# Patient Record
Sex: MALE | Race: ASIAN | URBAN - METROPOLITAN AREA
[De-identification: names, ages, dates, MRNs, and addresses within clinical notes are randomized per-mention and may not be internally consistent; named-entity substitution may affect disease eponyms.]

---

## 2018-07-11 ENCOUNTER — HOSPITAL ENCOUNTER (OUTPATIENT)
Dept: LAB | Age: 43
Discharge: HOME | End: 2018-07-11
Payer: COMMERCIAL

## 2018-07-11 ENCOUNTER — OFFICE VISIT (OUTPATIENT)
Dept: FAMILY MEDICINE CLINIC | Age: 43
End: 2018-07-11

## 2018-07-11 VITALS
DIASTOLIC BLOOD PRESSURE: 80 MMHG | WEIGHT: 182 LBS | HEIGHT: 66 IN | TEMPERATURE: 97.7 F | OXYGEN SATURATION: 98 % | RESPIRATION RATE: 17 BRPM | SYSTOLIC BLOOD PRESSURE: 117 MMHG | BODY MASS INDEX: 29.25 KG/M2 | HEART RATE: 73 BPM

## 2018-07-11 DIAGNOSIS — R19.5 DARK STOOLS: ICD-10-CM

## 2018-07-11 DIAGNOSIS — B35.0 TINEA CAPITIS: ICD-10-CM

## 2018-07-11 DIAGNOSIS — R53.82 CHRONIC FATIGUE: ICD-10-CM

## 2018-07-11 DIAGNOSIS — R79.89 LOW VITAMIN D LEVEL: ICD-10-CM

## 2018-07-11 DIAGNOSIS — R53.82 CHRONIC FATIGUE: Primary | ICD-10-CM

## 2018-07-11 DIAGNOSIS — G43.009 MIGRAINE WITHOUT AURA AND WITHOUT STATUS MIGRAINOSUS, NOT INTRACTABLE: ICD-10-CM

## 2018-07-11 LAB
ALBUMIN SERPL-MCNC: 3.9 G/DL (ref 3.4–5)
ALBUMIN/GLOB SERPL: 1.4 {RATIO} (ref 0.8–1.7)
ALP SERPL-CCNC: 62 U/L (ref 45–117)
ALT SERPL-CCNC: 32 U/L (ref 16–61)
ANION GAP SERPL CALC-SCNC: 7 MMOL/L (ref 3–18)
AST SERPL-CCNC: 24 U/L (ref 15–37)
BASOPHILS # BLD: 0 K/UL (ref 0–0.1)
BASOPHILS NFR BLD: 0 % (ref 0–2)
BILIRUB SERPL-MCNC: 0.4 MG/DL (ref 0.2–1)
BUN SERPL-MCNC: 17 MG/DL (ref 7–18)
BUN/CREAT SERPL: 20 (ref 12–20)
CALCIUM SERPL-MCNC: 8.6 MG/DL (ref 8.5–10.1)
CHLORIDE SERPL-SCNC: 108 MMOL/L (ref 100–108)
CO2 SERPL-SCNC: 25 MMOL/L (ref 21–32)
CREAT SERPL-MCNC: 0.87 MG/DL (ref 0.6–1.3)
DIFFERENTIAL METHOD BLD: ABNORMAL
EOSINOPHIL # BLD: 0.1 K/UL (ref 0–0.4)
EOSINOPHIL NFR BLD: 1 % (ref 0–5)
ERYTHROCYTE [DISTWIDTH] IN BLOOD BY AUTOMATED COUNT: 13.6 % (ref 11.6–14.5)
EST. AVERAGE GLUCOSE BLD GHB EST-MCNC: 105 MG/DL
GLOBULIN SER CALC-MCNC: 2.8 G/DL (ref 2–4)
GLUCOSE SERPL-MCNC: 115 MG/DL (ref 74–99)
HBA1C MFR BLD: 5.3 % (ref 4.2–5.6)
HCT VFR BLD AUTO: 25.2 % (ref 36–48)
HGB BLD-MCNC: 8.4 G/DL (ref 13–16)
LYMPHOCYTES # BLD: 2.8 K/UL (ref 0.9–3.6)
LYMPHOCYTES NFR BLD: 24 % (ref 21–52)
MCH RBC QN AUTO: 33.3 PG (ref 24–34)
MCHC RBC AUTO-ENTMCNC: 33.3 G/DL (ref 31–37)
MCV RBC AUTO: 100 FL (ref 74–97)
MONOCYTES # BLD: 0.7 K/UL (ref 0.05–1.2)
MONOCYTES NFR BLD: 6 % (ref 3–10)
NEUTS SEG # BLD: 8 K/UL (ref 1.8–8)
NEUTS SEG NFR BLD: 69 % (ref 40–73)
PLATELET # BLD AUTO: 211 K/UL (ref 135–420)
PLATELET COMMENTS,PCOM: ABNORMAL
PMV BLD AUTO: 11.4 FL (ref 9.2–11.8)
POTASSIUM SERPL-SCNC: 4.2 MMOL/L (ref 3.5–5.5)
PROT SERPL-MCNC: 6.7 G/DL (ref 6.4–8.2)
RBC # BLD AUTO: 2.52 M/UL (ref 4.7–5.5)
RBC MORPH BLD: ABNORMAL
SODIUM SERPL-SCNC: 140 MMOL/L (ref 136–145)
TSH SERPL DL<=0.05 MIU/L-ACNC: 1.2 UIU/ML (ref 0.36–3.74)
WBC # BLD AUTO: 11.6 K/UL (ref 4.6–13.2)

## 2018-07-11 PROCEDURE — 84443 ASSAY THYROID STIM HORMONE: CPT | Performed by: FAMILY MEDICINE

## 2018-07-11 PROCEDURE — 80053 COMPREHEN METABOLIC PANEL: CPT | Performed by: FAMILY MEDICINE

## 2018-07-11 PROCEDURE — 82652 VIT D 1 25-DIHYDROXY: CPT | Performed by: FAMILY MEDICINE

## 2018-07-11 PROCEDURE — 36415 COLL VENOUS BLD VENIPUNCTURE: CPT | Performed by: FAMILY MEDICINE

## 2018-07-11 PROCEDURE — 85025 COMPLETE CBC W/AUTO DIFF WBC: CPT | Performed by: FAMILY MEDICINE

## 2018-07-11 PROCEDURE — 83036 HEMOGLOBIN GLYCOSYLATED A1C: CPT | Performed by: FAMILY MEDICINE

## 2018-07-11 RX ORDER — BUTALBITAL, ACETAMINOPHEN AND CAFFEINE 50; 325; 40 MG/1; MG/1; MG/1
1 TABLET ORAL
Qty: 45 TAB | Refills: 2 | Status: SHIPPED | OUTPATIENT
Start: 2018-07-11

## 2018-07-11 RX ORDER — LANOLIN ALCOHOL/MO/W.PET/CERES
CREAM (GRAM) TOPICAL
COMMUNITY
End: 2018-08-18 | Stop reason: ALTCHOICE

## 2018-07-11 RX ORDER — BISMUTH SUBSALICYLATE 262 MG
1 TABLET,CHEWABLE ORAL DAILY
COMMUNITY

## 2018-07-11 RX ORDER — KETOCONAZOLE 20 MG/G
CREAM TOPICAL 2 TIMES DAILY
Qty: 15 G | Refills: 0 | Status: SHIPPED | OUTPATIENT
Start: 2018-07-11 | End: 2018-08-18 | Stop reason: ALTCHOICE

## 2018-07-11 NOTE — PROGRESS NOTES
Shaheen Parmar is a 38 y.o.  male and presents with worsening fatigue, dark stool, scalp skin lesion, hx of vit D, nausea  myalgias and migraines. He has not been seen in at least 3 years. No significant meds on a regular basis. Chief Complaint   Patient presents with    Nausea    Generalized Body Aches     Subjective: Additional Concerns: none    Patient Active Problem List    Diagnosis Date Noted    History of migraine 01/09/2015    History of vitamin D deficiency 01/09/2015     Current Outpatient Prescriptions   Medication Sig Dispense Refill    multivitamin (ONE A DAY) tablet Take 1 Tab by mouth daily.  melatonin 3 mg tablet Take  by mouth.  butalbital-acetaminophen-caffeine (FIORICET, ESGIC) -40 mg per tablet Take 1 Tab by mouth every six (6) hours as needed for Pain. Indications: Migraine 45 Tab 2    ketoconazole (NIZORAL) 2 % topical cream Apply  to affected area two (2) times a day. For 2-4 weeks. 15 g 0     No Known Allergies  Past Medical History:   Diagnosis Date    Migraine      History reviewed. No pertinent surgical history.   Family History   Problem Relation Age of Onset    Hypertension Mother     Elevated Lipids Mother     Diabetes Father     Hypertension Father    Manoj.Moras Elevated Lipids Father     Heart Disease Father      Social History   Substance Use Topics    Smoking status: Current Every Day Smoker     Packs/day: 0.50    Smokeless tobacco: Never Used    Alcohol use No     ROS     General: negative for - chills, fatigue, fever, weight change  Endo: negative for - hot flashes, polydipsia/polyuria or temperature intolerance  Resp: negative for - cough, shortness of breath or wheezing  CV: negative for - chest pain, edema or palpitations  GI: negative for - abdominal pain, change in bowel habits, constipation, diarrhea or positive nausea, novomiting  : negative for - dysuria, hematuria, incontinence  MSK: negative for - joint pain, joint swelling or muscle pain  Neuro: negative for - confusion, positive headaches, no seizures or weakness  Derm: negative for - dry skin, hair changes, positive scalp concentric rash or skin lesion changes    Objective:  Vitals:    07/11/18 0945   BP: 117/80   Pulse: 73   Resp: 17   Temp: 97.7 °F (36.5 °C)   TempSrc: Oral   SpO2: 98%   Weight: 182 lb (82.6 kg)   Height: 5' 6\" (1.676 m)   PainSc:   1     PE    Alert, well appearing, and in no distress, oriented to person, place, and time and overweight  General appearance - alert, well appearing, and in no distress, oriented to person, place, and time and normal appearing weight  Mental status - alert, oriented to person, place, and time, normal mood, behavior, speech, dress, motor activity, and thought processes  Chest - clear to auscultation, no wheezes, rales or rhonchi, symmetric air entry  Heart - normal rate, regular rhythm, normal S1, S2, no murmurs, rubs, clicks or gallops  Musculoskeletal - no joint tenderness, deformity or swelling  Extremities - peripheral pulses normal, no pedal edema, no clubbing or cyanosis  Skin - concentric raised, scaly rash on right sided scalp that is slightly pruritic. No open lesion or wound. LABS   No visits with results within 6 Month(s) from this visit. Latest known visit with results is:    Office Visit on 01/09/2015   Component Date Value Ref Range Status    VALID INTERNAL CONTROL POC 01/09/2015 Yes   Final    QuickVue Influenza test 01/09/2015 Negative  Negative Final     TESTS  Results for orders placed or performed in visit on 01/09/15   AMB POC RAPID INFLUENZA TEST   Result Value Ref Range    VALID INTERNAL CONTROL POC Yes     QuickVue Influenza test Negative Negative     Assessment/Plan:      1. Chronic fatigue  - CBC WITH AUTOMATED DIFF; Future  - METABOLIC PANEL, COMPREHENSIVE; Future  - TSH 3RD GENERATION; Future  - HEMOGLOBIN A1C WITH EAG; Future    2. Low vitamin D level  - VITAMIN D, 1, 25 DIHYDROXY; Future    3.  Dark stools  - REFERRAL TO GASTROENTEROLOGY work up and possble colonoscopy    4. Migraine without aura and without status migrainosus, not intractable  - butalbital-acetaminophen-caffeine (FIORICET, ESGIC) -40 mg per tablet; Take 1 Tab by mouth every six (6) hours as needed for Pain. Indications: Migraine  Dispense: 45 Tab; Refill: 2    5. Tinea capitis right sided scalp   - ketoconazole (NIZORAL) 2 % topical cream; Apply  to affected area two (2) times a day. For 2-4 weeks. Dispense: 15 g; Refill: 0    Lab review: orders written for new lab studies as appropriate; see orders. I have discussed the diagnosis with the patient and the intended plan as seen in the above orders. The patient has received an after-visit summary and questions were answered concerning future plans. I have discussed medication side effects and warnings with the patient as well. I have reviewed the plan of care with the patient, accepted their input and they are in agreement with the treatment goals. Follow-up Disposition:  Return in about 4 weeks (around 8/8/2018), or if not better from scalp lesion.     Jose Armando Lozano MD

## 2018-07-11 NOTE — LETTER
NOTIFICATION RETURN TO WORK  
 
7/11/2018 10:02 AM 
 
Mr. Collette Fillers [address] 
[address] [place] [address] To Whom It May Concern: 
 
Trevon Bassett is currently under the care of [address] [place] [address]. He will return to work on: 07/16/2018 Please excuse from 07/10/2018 - 07/15/2018 due to health concerns. If there are questions or concerns please have the patient contact our office. Sincerely, Rosi Johnson MD

## 2018-07-11 NOTE — PROGRESS NOTES
Severo Angeles is a 38 y.o. male presents to office for nausea, body aches, dizziness    Medication list has been reviewed with Severo Angeles and updated as of today's date     Health Maintenance items with a due date reviewed with patient:  Health Maintenance Due   Topic Date Due    Pneumococcal 19-64 Medium Risk (1 of 1 - PPSV23) 09/13/1992    DTaP/Tdap/Td series (1 - Tdap) 09/13/1994

## 2018-07-11 NOTE — PATIENT INSTRUCTIONS
Fatigue: Care Instructions  Your Care Instructions    Fatigue is a feeling of tiredness, exhaustion, or lack of energy. You may feel fatigue because of too much or not enough activity. It can also come from stress, lack of sleep, boredom, and poor diet. Many medical problems, such as viral infections, can cause fatigue. Emotional problems, especially depression, are often the cause of fatigue. Fatigue is most often a symptom of another problem. Treatment for fatigue depends on the cause. For example, if you have fatigue because you have a certain health problem, treating this problem also treats your fatigue. If depression or anxiety is the cause, treatment may help. Follow-up care is a key part of your treatment and safety. Be sure to make and go to all appointments, and call your doctor if you are having problems. It's also a good idea to know your test results and keep a list of the medicines you take. How can you care for yourself at home? · Get regular exercise. But don't overdo it. Go back and forth between rest and exercise. · Get plenty of rest.  · Eat a healthy diet. Do not skip meals, especially breakfast.  · Reduce your use of caffeine, tobacco, and alcohol. Caffeine is most often found in coffee, tea, cola drinks, and chocolate. · Limit medicines that can cause fatigue. This includes tranquilizers and cold and allergy medicines. When should you call for help? Watch closely for changes in your health, and be sure to contact your doctor if:    · You have new symptoms such as fever or a rash.     · Your fatigue gets worse.     · You have been feeling down, depressed, or hopeless. Or you may have lost interest in things that you usually enjoy.     · You are not getting better as expected. Where can you learn more? Go to http://crista-guido.info/. Enter G032 in the search box to learn more about \"Fatigue: Care Instructions. \"  Current as of: November 20, 2017  Content Version: 11.7  © 0760-3536 [employer]. Care instructions adapted under license by [employer] (which disclaims liability or warranty for this information). If you have questions about a medical condition or this instruction, always ask your healthcare professional. [address] any warranty or liability for your use of this information. Migraine Headache: Care Instructions  Your Care Instructions  Migraines are painful, throbbing headaches that often start on one side of the head. They may cause nausea and vomiting and make you sensitive to light, sound, or smell. Without treatment, migraines can last from 4 hours to a few days. Medicines can help prevent migraines or stop them after they have started. Your doctor can help you find which ones work best for you. Follow-up care is a key part of your treatment and safety. Be sure to make and go to all appointments, and call your doctor if you are having problems. It's also a good idea to know your test results and keep a list of the medicines you take. How can you care for yourself at home? · Do not drive if you have taken a prescription pain medicine. · Rest in a quiet, dark room until your headache is gone. Close your eyes, and try to relax or go to sleep. Don't watch TV or read. · Put a cold, moist cloth or cold pack on the painful area for 10 to 20 minutes at a time. Put a thin cloth between the cold pack and your skin. · Use a warm, moist towel or a heating pad set on low to relax tight shoulder and neck muscles. · Have someone gently massage your neck and shoulders. · Take your medicines exactly as prescribed. Call your doctor if you think you are having a problem with your medicine. You will get more details on the specific medicines your doctor prescribes. · Be careful not to take pain medicine more often than the instructions allow.  You could get worse or more frequent headaches when the medicine wears off. To prevent migraines  · Keep a headache diary so you can figure out what triggers your headaches. Avoiding triggers may help you prevent headaches. Record when each headache began, how long it lasted, and what the pain was like. (Was it throbbing, aching, stabbing, or dull?) Write down any other symptoms you had with the headache, such as nausea, flashing lights or dark spots, or sensitivity to bright light or loud noise. Note if the headache occurred near your period. List anything that might have triggered the headache. Triggers may include certain foods (chocolate, cheese, wine) or odors, smoke, bright light, stress, or lack of sleep. · If your doctor has prescribed medicine for your migraines, take it as directed. You may have medicine that you take only when you get a migraine and medicine that you take all the time to help prevent migraines. ¨ If your doctor has prescribed medicine for when you get a headache, take it at the first sign of a migraine, unless your doctor has given you other instructions. ¨ If your doctor has prescribed medicine to prevent migraines, take it exactly as prescribed. Call your doctor if you think you are having a problem with your medicine. · Find healthy ways to deal with stress. Migraines are most common during or right after stressful times. Take time to relax before and after you do something that has caused a migraine in the past.  · Try to keep your muscles relaxed by keeping good posture. Check your jaw, face, neck, and shoulder muscles for tension. Try to relax them. When you sit at a desk, change positions often. And make sure to stretch for 30 seconds each hour. · Get plenty of sleep and exercise. · Eat meals on a regular schedule. Avoid foods and drinks that often trigger migraines.  These include chocolate, alcohol (especially red wine and port), aspartame, monosodium glutamate (MSG), and some additives found in foods (such as hot dogs, kam, cold cuts, aged cheeses, and pickled foods). · Limit caffeine. Don't drink too much coffee, tea, or soda. But don't quit caffeine suddenly. That can also give you migraines. · Do not smoke or allow others to smoke around you. If you need help quitting, talk to your doctor about stop-smoking programs and medicines. These can increase your chances of quitting for good. · If you are taking birth control pills or hormone therapy, talk to your doctor about whether they are triggering your migraines. When should you call for help? Call 911 anytime you think you may need emergency care. For example, call if:    · You have signs of a stroke. These may include:  ¨ Sudden numbness, paralysis, or weakness in your face, arm, or leg, especially on only one side of your body. ¨ Sudden vision changes. ¨ Sudden trouble speaking. ¨ Sudden confusion or trouble understanding simple statements. ¨ Sudden problems with walking or balance. ¨ A sudden, severe headache that is different from past headaches.    Call your doctor now or seek immediate medical care if:    · You have new or worse nausea and vomiting.     · You have a new or higher fever.     · Your headache gets much worse.    Watch closely for changes in your health, and be sure to contact your doctor if:    · You are not getting better after 2 days (48 hours). Where can you learn more? Go to http://crista-guido.info/. Enter P022 in the search box to learn more about \"Migraine Headache: Care Instructions. \"  Current as of: October 9, 2017  Content Version: 11.7  © 8395-6929 [employer]. Care instructions adapted under license by [employer] (which disclaims liability or warranty for this information). If you have questions about a medical condition or this instruction, always ask your healthcare professional. [place] [address] any warranty or liability for your use of this information.        Lower Gastrointestinal Bleeding: Care Instructions  Your Care Instructions    The digestive or gastrointestinal tract goes from the mouth to the anus. It is often called the GI tract. Bleeding in the lower GI tract can happen anywhere in your small or large intestine. It can also happen in your rectum or anus. In some cases, it is caused by an infection, cancer, or inflammatory bowel disease. Or it may be caused by hemorrhoids, diverticulitis, or clotting problems. Light bleeding may not cause any symptoms at first. But if you continue to bleed for a while, you may feel very weak or tired. Sudden, heavy bleeding means you need to see a doctor right away. This kind of bleeding can be very dangerous. But it can usually be cured or controlled. The doctor may do some tests to find the cause of your bleeding. Follow-up care is a key part of your treatment and safety. Be sure to make and go to all appointments, and call your doctor if you are having problems. It's also a good idea to know your test results and keep a list of the medicines you take. How can you care for yourself at home? · Be safe with medicines. Take your medicines exactly as prescribed. Call your doctor if you think you are having a problem with your medicine. You will get more details on the specific medicines your doctor prescribes. · Do not take aspirin or other anti-inflammatory medicines, such as naproxen (Aleve) or ibuprofen (Advil, Motrin), without talking to your doctor first. Ask your doctor if it is okay to use acetaminophen (Tylenol). · Do not drink alcohol. · The bleeding may make you lose iron. So it's important to eat foods that have a lot of iron. These include red meat, shellfish, poultry, and eggs. They also include beans, raisins, whole-grain breads, and leafy green vegetables. If you want help planning meals, you can meet with a dietitian. When should you call for help? Call 911 anytime you think you may need emergency care.  For example, call if:    · You have sudden, severe belly pain.     · You vomit blood or what looks like coffee grounds.     · You passed out (lost consciousness).     · Your stools are maroon or very bloody.    Call your doctor now or seek immediate medical care if:    · You are dizzy or lightheaded, or you feel like you may faint.     · Your stools are black and look like tar, or they have streaks of blood.     · You have belly pain.     · You vomit or have nausea.    Watch closely for changes in your health, and be sure to contact your doctor if you do not get better as expected. Where can you learn more? Go to http://crista-guido.info/. Enter E211 in the search box to learn more about \"Lower Gastrointestinal Bleeding: Care Instructions. \"  Current as of: November 20, 2017  Content Version: 11.7  © 4046-3382 [employer]. Care instructions adapted under license by [employer] (which disclaims liability or warranty for this information). If you have questions about a medical condition or this instruction, always ask your healthcare professional. [place] [address] any warranty or liability for your use of this information. Ringworm of the Scalp: Care Instructions  Your Care Instructions  Ringworm is a fungus infection of the skin. It is not caused by a worm or bug. Ringworm causes round patches of baldness or scaly skin on the scalp. Ringworm of the scalp is most common in children 1to 5years old. Sometimes a blister-like rash appears on the face with ringworm of the scalp. This is an allergic reaction that usually clears when the ringworm is treated. The fungus that causes ringworm of the scalp spreads from person to person. You can catch ringworm by sharing hats, lucas, brushes, towels, telephones, or sports equipment. You can also get it by touching a person with ringworm. Once in a while, it can also spread from a dog or cat to a person.   Ringworm of the scalp is treated with pills. Ringworm may come back after treatment. Treating ringworm of the scalp can prevent scarring and permanent hair loss. Follow-up care is a key part of your treatment and safety. Be sure to make and go to all appointments, and call your doctor if you are having problems. It's also a good idea to know your test results and keep a list of the medicines you take. How can you care for yourself at home? · Take your medicines exactly as prescribed. Call your doctor if you have any problems with your medicine. · Ask your doctor if a shampoo might help. Special shampoos for ringworm contain selenium sulfide or ketoconazole. Your doctor can let you know if and how often you can use one. · To prevent spreading ringworm:  ¨ As soon as you start treatment, throw away your lucas and brushes, and buy new ones. Do not share hats, sport equipment, or other objects. Ringworm-causing fungus can live on objects, people, or animals for several months. ¨ Wash your hands well after caring for someone with ringworm. Adults who have contact with a child with ringworm of the scalp can become a carrier. A carrier does not have a ringworm infection but can pass ringworm to others. ¨ Wash your clothes, towels, and bed sheets in hot, soapy water. When should you call for help? Call your doctor now or seek immediate medical care if:    · You have signs of infection such as:  ¨ Increased pain, swelling, redness, tenderness, or heat. ¨ Red streaks extending from the area. ¨ Pus coming from the rash on your skin. ¨ A fever.    Watch closely for changes in your health, and be sure to contact your doctor if:    · Your ringworm does not improve after 2 weeks of treatment.     · You do not get better as expected. Where can you learn more? Go to http://crista-guido.info/. Enter 6210 2799 in the search box to learn more about \"Ringworm of the Scalp: Care Instructions. \"  Current as of: October 5, 2017  Content Version: 11.7  © 6470-2454 [employer]. Care instructions adapted under license by [employer] (which disclaims liability or warranty for this information). If you have questions about a medical condition or this instruction, always ask your healthcare professional. [address] any warranty or liability for your use of this information.

## 2018-07-12 ENCOUNTER — TELEPHONE (OUTPATIENT)
Dept: FAMILY MEDICINE CLINIC | Age: 43
End: 2018-07-12

## 2018-07-12 LAB — 1,25(OH)2D3 SERPL-MCNC: 79 PG/ML (ref 19.9–79.3)

## 2018-07-12 NOTE — PROGRESS NOTES
Pls inform patient that he has significant anemia on CBC, pending Vit D. Pls ask for a visit to discuss further work up and treatment.

## 2018-07-12 NOTE — TELEPHONE ENCOUNTER
----- Message from Rupert Kim MD sent at 7/12/2018  9:10 AM EDT -----  Pls inform patient that he has significant anemia on CBC, pending Vit D. Pls ask for a visit to discuss further work up and treatment.

## 2018-07-12 NOTE — TELEPHONE ENCOUNTER
Discussed abnormal lab results with patient's wife Jarred Zee. Scheduled patient for an appt to f/u with Dr. Kennedi Pritchett 07/17/2018 @215pm. Closing encounter.

## 2018-07-14 NOTE — PROGRESS NOTES
Pls report rest of labs essentially normal. Pls expedite referral to GI for possible GI bleed with anemia.

## 2018-07-17 ENCOUNTER — TELEPHONE (OUTPATIENT)
Dept: FAMILY MEDICINE CLINIC | Age: 43
End: 2018-07-17

## 2018-07-17 NOTE — TELEPHONE ENCOUNTER
----- Message from Xiang Donald MD sent at 7/14/2018  9:37 AM EDT -----  Pls report rest of labs essentially normal. Pls expedite referral to GI for possible GI bleed with anemia.

## 2018-07-18 ENCOUNTER — HOSPITAL ENCOUNTER (OUTPATIENT)
Dept: LAB | Age: 43
Discharge: HOME | End: 2018-07-18
Payer: COMMERCIAL

## 2018-07-18 ENCOUNTER — OFFICE VISIT (OUTPATIENT)
Dept: FAMILY MEDICINE CLINIC | Age: 43
End: 2018-07-18

## 2018-07-18 VITALS
RESPIRATION RATE: 17 BRPM | DIASTOLIC BLOOD PRESSURE: 88 MMHG | BODY MASS INDEX: 29.25 KG/M2 | TEMPERATURE: 98 F | WEIGHT: 182 LBS | OXYGEN SATURATION: 98 % | SYSTOLIC BLOOD PRESSURE: 123 MMHG | HEIGHT: 66 IN | HEART RATE: 70 BPM

## 2018-07-18 DIAGNOSIS — Z13.220 SCREENING CHOLESTEROL LEVEL: ICD-10-CM

## 2018-07-18 DIAGNOSIS — K92.2 GASTROINTESTINAL HEMORRHAGE, UNSPECIFIED GASTROINTESTINAL HEMORRHAGE TYPE: ICD-10-CM

## 2018-07-18 DIAGNOSIS — K92.2 GASTROINTESTINAL HEMORRHAGE, UNSPECIFIED GASTROINTESTINAL HEMORRHAGE TYPE: Primary | ICD-10-CM

## 2018-07-18 LAB
BASOPHILS # BLD: 0 K/UL (ref 0–0.1)
BASOPHILS NFR BLD: 0 % (ref 0–2)
DIFFERENTIAL METHOD BLD: ABNORMAL
EOSINOPHIL # BLD: 0.1 K/UL (ref 0–0.4)
EOSINOPHIL NFR BLD: 1 % (ref 0–5)
ERYTHROCYTE [DISTWIDTH] IN BLOOD BY AUTOMATED COUNT: 15.4 % (ref 11.6–14.5)
HCT VFR BLD AUTO: 26.3 % (ref 36–48)
HGB BLD-MCNC: 8.2 G/DL (ref 13–16)
LYMPHOCYTES # BLD: 2.1 K/UL (ref 0.9–3.6)
LYMPHOCYTES NFR BLD: 25 % (ref 21–52)
MCH RBC QN AUTO: 33.2 PG (ref 24–34)
MCHC RBC AUTO-ENTMCNC: 31.2 G/DL (ref 31–37)
MCV RBC AUTO: 106.5 FL (ref 74–97)
MONOCYTES # BLD: 0.5 K/UL (ref 0.05–1.2)
MONOCYTES NFR BLD: 6 % (ref 3–10)
NEUTS SEG # BLD: 5.8 K/UL (ref 1.8–8)
NEUTS SEG NFR BLD: 68 % (ref 40–73)
PLATELET # BLD AUTO: 379 K/UL (ref 135–420)
PMV BLD AUTO: 9.8 FL (ref 9.2–11.8)
RBC # BLD AUTO: 2.47 M/UL (ref 4.7–5.5)
WBC # BLD AUTO: 8.6 K/UL (ref 4.6–13.2)

## 2018-07-18 PROCEDURE — 36415 COLL VENOUS BLD VENIPUNCTURE: CPT | Performed by: FAMILY MEDICINE

## 2018-07-18 PROCEDURE — 80061 LIPID PANEL: CPT | Performed by: FAMILY MEDICINE

## 2018-07-18 PROCEDURE — 85025 COMPLETE CBC W/AUTO DIFF WBC: CPT | Performed by: FAMILY MEDICINE

## 2018-07-18 RX ORDER — DEXLANSOPRAZOLE 60 MG/1
CAPSULE, DELAYED RELEASE ORAL
COMMUNITY
End: 2018-08-18 | Stop reason: ALTCHOICE

## 2018-07-18 NOTE — PROGRESS NOTES
Wayne Gutierrez is a 38 y.o.  male and presents with F/U for possible upper GI bleed anemia and fatigue. Subjective: Additional Concerns: none    Patient Active Problem List    Diagnosis Date Noted    History of migraine 01/09/2015    History of vitamin D deficiency 01/09/2015     Current Outpatient Prescriptions   Medication Sig Dispense Refill    multivitamin (ONE A DAY) tablet Take 1 Tab by mouth daily.  melatonin 3 mg tablet Take  by mouth.  butalbital-acetaminophen-caffeine (FIORICET, ESGIC) -40 mg per tablet Take 1 Tab by mouth every six (6) hours as needed for Pain. Indications: Migraine 45 Tab 2    ketoconazole (NIZORAL) 2 % topical cream Apply  to affected area two (2) times a day. For 2-4 weeks. 15 g 0     No Known Allergies  Past Medical History:   Diagnosis Date    Migraine      No past surgical history on file.   Family History   Problem Relation Age of Onset    Hypertension Mother     Elevated Lipids Mother     Diabetes Father     Hypertension Father    [facility] Elevated Lipids Father     Heart Disease Father      Social History   Substance Use Topics    Smoking status: Current Every Day Smoker     Packs/day: 0.50    Smokeless tobacco: Never Used    Alcohol use No     ROS     General: negative for - chills, fatigue, fever, weight change  Resp: negative for - cough, shortness of breath or wheezing  CV: negative for - chest pain, edema or palpitations  GI: negative for - abdominal pain, change in bowel habits, constipation, diarrhea or nausea/vomiting  : negative for - dysuria, hematuria, incontinence, pelvic pain or vulvar/vaginal symptoms    Objective:    alert, well appearing, and in no distress, oriented to person, place, and time and overweight  General appearance - alert, well appearing, and in no distress, oriented to person, place, and time and overweight  Mental status - alert, oriented to person, place, and time, normal mood, behavior, speech, dress, motor activity, and thought processes  Chest - clear to auscultation, no wheezes, rales or rhonchi, symmetric air entry  Heart - normal rate, regular rhythm, normal S1, S2, no murmurs, rubs, clicks or gallops  Extremities - peripheral pulses normal, no pedal edema, no clubbing or cyanosis    [facility] Outpatient Visit on 07/11/2018   Component Date Value Ref Range Status    WBC 07/11/2018 11.6  4.6 - 13.2 K/uL Final    RBC 07/11/2018 2.52* 4.70 - 5.50 M/uL Final    HGB 07/11/2018 8.4* 13.0 - 16.0 g/dL Final    HCT 07/11/2018 25.2* 36.0 - 48.0 % Final    MCV 07/11/2018 100.0* 74.0 - 97.0 FL Final    MCH 07/11/2018 33.3  24.0 - 34.0 PG Final    MCHC 07/11/2018 33.3  31.0 - 37.0 g/dL Final    RDW 07/11/2018 13.6  11.6 - 14.5 % Final    PLATELET 79/05/7768 861  135 - 420 K/uL Final    MPV 07/11/2018 11.4  9.2 - 11.8 FL Final    NEUTROPHILS 07/11/2018 69  40 - 73 % Final    LYMPHOCYTES 07/11/2018 24  21 - 52 % Final    MONOCYTES 07/11/2018 6  3 - 10 % Final    EOSINOPHILS 07/11/2018 1  0 - 5 % Final    BASOPHILS 07/11/2018 0  0 - 2 % Final    ABS. NEUTROPHILS 07/11/2018 8.0  1.8 - 8.0 K/UL Final    ABS. LYMPHOCYTES 07/11/2018 2.8  0.9 - 3.6 K/UL Final    ABS. MONOCYTES 07/11/2018 0.7  0.05 - 1.2 K/UL Final    ABS. EOSINOPHILS 07/11/2018 0.1  0.0 - 0.4 K/UL Final    ABS.  BASOPHILS 07/11/2018 0.0  0.0 - 0.1 K/UL Final    PLATELET COMMENTS 38/56/9183 ADEQUATE PLATELETS    Final    RBC COMMENTS 07/11/2018     Final                    Value:MACROCYTOSIS  1+      DF 07/11/2018 MANUAL    Final    Sodium 07/11/2018 140  136 - 145 mmol/L Final    Potassium 07/11/2018 4.2  3.5 - 5.5 mmol/L Final    Chloride 07/11/2018 108  100 - 108 mmol/L Final    CO2 07/11/2018 25  21 - 32 mmol/L Final    Anion gap 07/11/2018 7  3.0 - 18 mmol/L Final    Glucose 07/11/2018 115* 74 - 99 mg/dL Final    BUN 07/11/2018 17  7.0 - 18 MG/DL Final    Creatinine 07/11/2018 0.87  0.6 - 1.3 MG/DL Final    BUN/Creatinine ratio 07/11/2018 20  12 - 20   Final    GFR est AA 07/11/2018 >60  >60 ml/min/1.73m2 Final    GFR est non-AA 07/11/2018 >60  >60 ml/min/1.73m2 Final    Comment: (NOTE)  Estimated GFR is calculated using the Modification of Diet in Renal   Disease (MDRD) Study equation, reported for both  Americans   (GFRAA) and non- Americans (GFRNA), and normalized to 1.73m2   body surface area. The physician must decide which value applies to   the patient. The MDRD study equation should only be used in   individuals age 25 or older. It has not been validated for the   following: pregnant women, patients with serious comorbid conditions,   or on certain medications, or persons with extremes of body size,   muscle mass, or nutritional status.  Calcium 07/11/2018 8.6  8.5 - 10.1 MG/DL Final    Bilirubin, total 07/11/2018 0.4  0.2 - 1.0 MG/DL Final    ALT (SGPT) 07/11/2018 32  16 - 61 U/L Final    AST (SGOT) 07/11/2018 24  15 - 37 U/L Final    Alk. phosphatase 07/11/2018 62  45 - 117 U/L Final    Protein, total 07/11/2018 6.7  6.4 - 8.2 g/dL Final    Albumin 07/11/2018 3.9  3.4 - 5.0 g/dL Final    Globulin 07/11/2018 2.8  2.0 - 4.0 g/dL Final    A-G Ratio 07/11/2018 1.4  0.8 - 1.7   Final    TSH 07/11/2018 1.20  0.36 - 3.74 uIU/mL Final    Hemoglobin A1c 07/11/2018 5.3  4.2 - 5.6 % Final    Comment: (NOTE)  HbA1C Interpretive Ranges  <5.7              Normal  5.7 - 6.4         Consider Prediabetes  >6.5              Consider Diabetes      Est. average glucose 07/11/2018 105  mg/dL Final    Comment: (NOTE)  The eAG should be interpreted with patient characteristics in mind   since ethnicity, interindividual differences, red cell lifespan,   variation in rates of glycation, etc. may affect the validity of the   calculation.       Calcitriol (Vit D 1, 25 di-OH) 07/11/2018 79.0  19.9 - 79.3 pg/mL Final    Comment: (NOTE)  Performed At: [address] [id number]  Nicki Hernandez MD [id number]         TESTS  Results for orders placed or performed during the hospital encounter of 07/11/18   CBC WITH AUTOMATED DIFF   Result Value Ref Range    WBC 11.6 4.6 - 13.2 K/uL    RBC 2.52 (L) 4.70 - 5.50 M/uL    HGB 8.4 (L) 13.0 - 16.0 g/dL    HCT 25.2 (L) 36.0 - 48.0 %    .0 (H) 74.0 - 97.0 FL    MCH 33.3 24.0 - 34.0 PG    MCHC 33.3 31.0 - 37.0 g/dL    RDW 13.6 11.6 - 14.5 %    PLATELET 576 207 - 063 K/uL    MPV 11.4 9.2 - 11.8 FL    NEUTROPHILS 69 40 - 73 %    LYMPHOCYTES 24 21 - 52 %    MONOCYTES 6 3 - 10 %    EOSINOPHILS 1 0 - 5 %    BASOPHILS 0 0 - 2 %    ABS. NEUTROPHILS 8.0 1.8 - 8.0 K/UL    ABS. LYMPHOCYTES 2.8 0.9 - 3.6 K/UL    ABS. MONOCYTES 0.7 0.05 - 1.2 K/UL    ABS. EOSINOPHILS 0.1 0.0 - 0.4 K/UL    ABS. BASOPHILS 0.0 0.0 - 0.1 K/UL    PLATELET COMMENTS ADEQUATE PLATELETS      RBC COMMENTS MACROCYTOSIS  1+        DF MANUAL     METABOLIC PANEL, COMPREHENSIVE   Result Value Ref Range    Sodium 140 136 - 145 mmol/L    Potassium 4.2 3.5 - 5.5 mmol/L    Chloride 108 100 - 108 mmol/L    CO2 25 21 - 32 mmol/L    Anion gap 7 3.0 - 18 mmol/L    Glucose 115 (H) 74 - 99 mg/dL    BUN 17 7.0 - 18 MG/DL    Creatinine 0.87 0.6 - 1.3 MG/DL    BUN/Creatinine ratio 20 12 - 20      GFR est AA >60 >60 ml/min/1.73m2    GFR est non-AA >60 >60 ml/min/1.73m2    Calcium 8.6 8.5 - 10.1 MG/DL    Bilirubin, total 0.4 0.2 - 1.0 MG/DL    ALT (SGPT) 32 16 - 61 U/L    AST (SGOT) 24 15 - 37 U/L    Alk.  phosphatase 62 45 - 117 U/L    Protein, total 6.7 6.4 - 8.2 g/dL    Albumin 3.9 3.4 - 5.0 g/dL    Globulin 2.8 2.0 - 4.0 g/dL    A-G Ratio 1.4 0.8 - 1.7     TSH 3RD GENERATION   Result Value Ref Range    TSH 1.20 0.36 - 3.74 uIU/mL   HEMOGLOBIN A1C WITH EAG   Result Value Ref Range    Hemoglobin A1c 5.3 4.2 - 5.6 %    Est. average glucose 105 mg/dL   VITAMIN D, 1, 25 DIHYDROXY   Result Value Ref Range    Calcitriol (Vit D 1, 25 di-OH) 79.0 19.9 - 79.3 pg/mL     Assessment/Plan: 1.Gastrointestinal hemorrhage, unspecified gastrointestinal hemorrhage type - Per GI appears to have stopped. - CBC WITH AUTOMATED DIFF; Future    2. Fatigue/screening chol - FLP ordered. Lab review: orders written for new lab studies as appropriate; see orders. We will call for results further plan. I have discussed the diagnosis with the patient and the intended plan as seen in the above orders. The patient has received an after-visit summary and questions were answered concerning future plans. I have discussed medication side effects and warnings with the patient as well. I have reviewed the plan of care with the patient, accepted their input and they are in agreement with the treatment goals. F/U as needed.      Lora Mora MD

## 2018-07-18 NOTE — PROGRESS NOTES
Shaheen Parmar is a 38 y.o. male presents to office for lethargic, weak, and tired  Medication list has been reviewed with Shaheen Parmar and updated as of today's date     Health Maintenance items with a due date reviewed with patient:  Health Maintenance Due   Topic Date Due    Pneumococcal 19-64 Medium Risk (1 of 1 - PPSV23) 09/13/1992    DTaP/Tdap/Td series (1 - Tdap) 09/13/1994

## 2018-07-18 NOTE — LETTER
NOTIFICATION RETURN TO WORK  
 
7/18/2018 9:57 AM 
 
Mr. Destiny Murillo [address] [address] To Whom It May Concern: 
 
Nelida Delgado is currently under the care of [address] [place] [address]. He will return to work on: 07/23/2018. Please excuse 07/18/2018 - 07/22/2018 for medical reasons. If there are questions or concerns please have the patient contact our office. Sincerely, Xiang Donald MD

## 2018-07-18 NOTE — PATIENT INSTRUCTIONS
Gastrointestinal Bleeding: Care Instructions  Your Care Instructions    The digestive or gastrointestinal tract goes from the mouth to the anus. It is often called the GI tract. Bleeding can happen anywhere in the GI tract. It may be caused by an ulcer, an infection, or cancer. It may also be caused by medicines such as aspirin or ibuprofen. Light bleeding may not cause any symptoms at first. But if you continue to bleed for a while, you may feel very weak or tired. Sudden, heavy bleeding means you need to see a doctor right away. This kind of bleeding can be very dangerous. But it can usually be cured or controlled. The doctor may do some tests to find the cause of your bleeding. Follow-up care is a key part of your treatment and safety. Be sure to make and go to all appointments, and call your doctor if you are having problems. It's also a good idea to know your test results and keep a list of the medicines you take. How can you care for yourself at home? · Be safe with medicines. Take your medicines exactly as prescribed. Call your doctor if you think you are having a problem with your medicine. You will get more details on the specific medicines your doctor prescribes. · Do not take aspirin or other anti-inflammatory medicines, such as naproxen (Aleve) or ibuprofen (Advil, Motrin), without talking to your doctor first. Ask your doctor if it is okay to use acetaminophen (Tylenol). · Do not drink alcohol. · The bleeding may make you lose iron. So it's important to eat foods that have a lot of iron. These include red meat, shellfish, poultry, and eggs. They also include beans, raisins, whole-grain breads, and leafy green vegetables. If you want help planning meals, you can make an appointment with a dietitian. When should you call for help? Call 911 anytime you think you may need emergency care.  For example, call if:    · You have sudden, severe belly pain.     · You vomit blood or what looks like coffee grounds.     · You passed out (lost consciousness).     · Your stools are maroon or very bloody.    Call your doctor now or seek immediate medical care if:    · You are dizzy or lightheaded, or you feel like you may faint.     · Your stools are black and look like tar, or they have streaks of blood.     · You have belly pain.     · You vomit or have nausea.     · You have trouble swallowing, or it hurts when you swallow.    Watch closely for changes in your health, and be sure to contact your doctor if:    · You do not get better as expected. Where can you learn more? Go to http://crista-guido.info/. Enter K455 in the search box to learn more about \"Gastrointestinal Bleeding: Care Instructions. \"  Current as of: November 20, 2017  Content Version: 11.7  © 3982-4058 [employer]. Care instructions adapted under license by [employer] (which disclaims liability or warranty for this information). If you have questions about a medical condition or this instruction, always ask your healthcare professional. [address] any warranty or liability for your use of this information.

## 2018-07-19 ENCOUNTER — TELEPHONE (OUTPATIENT)
Dept: FAMILY MEDICINE CLINIC | Age: 43
End: 2018-07-19

## 2018-07-19 LAB
CHOLEST SERPL-MCNC: 216 MG/DL
HDLC SERPL-MCNC: 56 MG/DL (ref 40–60)
HDLC SERPL: 3.9 {RATIO} (ref 0–5)
LDLC SERPL CALC-MCNC: 120.4 MG/DL (ref 0–100)
LIPID PROFILE,FLP: ABNORMAL
TRIGL SERPL-MCNC: 198 MG/DL (ref ?–150)
VLDLC SERPL CALC-MCNC: 39.6 MG/DL

## 2018-07-19 NOTE — TELEPHONE ENCOUNTER
----- Message from eDbbie Juarez MD sent at 7/19/2018  8:14 AM EDT -----  Pls advise patient that his hgb is still almost same after one week. Pls follow closely with GI and we will monitor Hgb regularly maybe next check in 1 month.

## 2018-07-19 NOTE — PROGRESS NOTES
Pls advise patient that his hgb is still almost same after one week. Pls follow closely with GI and we will monitor Hgb regularly maybe next check in 1 month.

## 2018-08-18 ENCOUNTER — HOSPITAL ENCOUNTER (OUTPATIENT)
Dept: LAB | Age: 43
Discharge: HOME | End: 2018-08-18
Payer: COMMERCIAL

## 2018-08-18 ENCOUNTER — OFFICE VISIT (OUTPATIENT)
Dept: FAMILY MEDICINE CLINIC | Age: 43
End: 2018-08-18

## 2018-08-18 VITALS
SYSTOLIC BLOOD PRESSURE: 100 MMHG | DIASTOLIC BLOOD PRESSURE: 68 MMHG | HEART RATE: 101 BPM | OXYGEN SATURATION: 100 % | BODY MASS INDEX: 28.67 KG/M2 | HEIGHT: 66 IN | TEMPERATURE: 97.8 F | WEIGHT: 178.4 LBS | RESPIRATION RATE: 17 BRPM

## 2018-08-18 DIAGNOSIS — D50.0 BLOOD LOSS ANEMIA: Primary | ICD-10-CM

## 2018-08-18 DIAGNOSIS — D50.0 BLOOD LOSS ANEMIA: ICD-10-CM

## 2018-08-18 LAB
BASOPHILS # BLD: 0 K/UL (ref 0–0.1)
BASOPHILS NFR BLD: 1 % (ref 0–2)
DIFFERENTIAL METHOD BLD: ABNORMAL
EOSINOPHIL # BLD: 0.2 K/UL (ref 0–0.4)
EOSINOPHIL NFR BLD: 3 % (ref 0–5)
ERYTHROCYTE [DISTWIDTH] IN BLOOD BY AUTOMATED COUNT: 15.4 % (ref 11.6–14.5)
FOLATE SERPL-MCNC: >20 NG/ML (ref 3.1–17.5)
HCT VFR BLD AUTO: 31.9 % (ref 36–48)
HGB BLD-MCNC: 9.5 G/DL (ref 13–16)
LYMPHOCYTES # BLD: 1.9 K/UL (ref 0.9–3.6)
LYMPHOCYTES NFR BLD: 26 % (ref 21–52)
MCH RBC QN AUTO: 29.6 PG (ref 24–34)
MCHC RBC AUTO-ENTMCNC: 29.8 G/DL (ref 31–37)
MCV RBC AUTO: 99.4 FL (ref 74–97)
MONOCYTES # BLD: 0.7 K/UL (ref 0.05–1.2)
MONOCYTES NFR BLD: 10 % (ref 3–10)
NEUTS SEG # BLD: 4.4 K/UL (ref 1.8–8)
NEUTS SEG NFR BLD: 60 % (ref 40–73)
PLATELET # BLD AUTO: 429 K/UL (ref 135–420)
PMV BLD AUTO: 10.1 FL (ref 9.2–11.8)
RBC # BLD AUTO: 3.21 M/UL (ref 4.7–5.5)
VIT B12 SERPL-MCNC: 522 PG/ML (ref 211–911)
WBC # BLD AUTO: 7.3 K/UL (ref 4.6–13.2)

## 2018-08-18 PROCEDURE — 82746 ASSAY OF FOLIC ACID SERUM: CPT | Performed by: FAMILY MEDICINE

## 2018-08-18 PROCEDURE — 36415 COLL VENOUS BLD VENIPUNCTURE: CPT | Performed by: FAMILY MEDICINE

## 2018-08-18 PROCEDURE — 85025 COMPLETE CBC W/AUTO DIFF WBC: CPT | Performed by: FAMILY MEDICINE

## 2018-08-18 RX ORDER — PANTOPRAZOLE SODIUM 40 MG/1
1 TABLET, DELAYED RELEASE ORAL
Refills: 3 | COMMUNITY
Start: 2018-08-03

## 2018-08-18 NOTE — MR AVS SNAPSHOT
[address] [address] 
[phone] Patient: Armani Gonzales MRN: [id number]:[id number] Visit Information Date & Time Provider Department Dept. Phone Encounter #  
 8/18/2018  9:30 AM MOHIT Simmons [facility] [phone] [id number] Follow-up Instructions Return if symptoms worsen or fail to improve. Upcoming Health Maintenance Date Due Pneumococcal 19-64 Medium Risk (1 of 1 - PPSV23) 9/13/1992 DTaP/Tdap/Td series (1 - Tdap) 9/13/1994 Influenza Age 5 to Adult 8/1/2018 Allergies as of 8/18/2018  Review Complete On: 8/18/2018 By: Gerri Murphy LPN No Known Allergies Current Immunizations  Never Reviewed No immunizations on file. Not reviewed this visit You Were Diagnosed With   
  
 Codes Comments Blood loss anemia    -  Primary ICD-10-CM: D50.0 ICD-9-CM: 280.0 Vitals BP Pulse Temp Resp Height(growth percentile) Weight(growth percentile) 100/68 (BP 1 Location: Right arm, BP Patient Position: Sitting) (!) 101 97.8 °F (36.6 °C) (Oral) 17 5' 6\" (1.676 m) 178 lb 6.4 oz (80.9 kg) SpO2 BMI Smoking Status 100% 28.79 kg/m2 Current Every Day Smoker Vitals History BMI and BSA Data Body Mass Index Body Surface Area 28.79 kg/m 2 1.94 m 2 Preferred Pharmacy Pharmacy Name Phone [facility] [address] [facility] [place] [phone] Your Updated Medication List  
  
   
This list is accurate as of 8/18/18  9:51 AM.  Always use your most recent med list.  
  
  
  
  
 butalbital-acetaminophen-caffeine -40 mg per tablet Commonly known as:  Kvng Mar Take 1 Tab by mouth every six (6) hours as needed for Pain. Indications: Migraine  
  
 multivitamin tablet Commonly known as:  ONE A DAY Take 1 Tab by mouth daily. pantoprazole 40 mg tablet Commonly known as:  PROTONIX Take 1 Tab by mouth Daily (before breakfast). Follow-up Instructions Return if symptoms worsen or fail to improve. Patient Instructions Iron Deficiency Anemia: Care Instructions Your Care Instructions Anemia means that you do not have enough red blood cells. Red blood cells carry oxygen around your body. When you have anemia, it can make you pale, weak, and tired. Many things can cause anemia. The most common cause is loss of blood. This can happen if you have heavy menstrual periods. It can also happen if you have bleeding in your stomach or bowel. You can also get anemia if you don't have enough iron in your diet or if it's hard for your body to absorb iron. In some cases, pregnancy causes anemia. That's because a pregnant woman needs more iron. Your doctor may do more tests to find the cause of your anemia. If a disease or other health problem is causing it, your doctor will treat that problem. It's important to follow up with your doctor to make sure that your iron level returns to normal. 
Follow-up care is a key part of your treatment and safety. Be sure to make and go to all appointments, and call your doctor if you are having problems. It's also a good idea to know your test results and keep a list of the medicines you take. How can you care for yourself at home? · If your doctor recommended iron pills, take them as directed. ¨ Try to take the pills on an empty stomach. You can do this about 1 hour before or 2 hours after meals. But you may need to take iron with food to avoid an upset stomach. ¨ Do not take antacids or drink milk or anything with caffeine within 2 hours of when you take your iron. They can keep your body from absorbing the iron well. ¨ Vitamin C helps your body absorb iron.  You may want to take iron pills with a glass of orange juice or some other food high in vitamin C. 
 ¨ Iron pills may cause stomach problems. These include heartburn, nausea, diarrhea, constipation, and cramps. It can help to drink plenty of fluids and include fruits, vegetables, and fiber in your diet. ¨ It's normal for iron pills to make your stool a greenish or grayish black. But internal bleeding can also cause dark stool. So it's important to tell your doctor about any color changes. ¨ Call your doctor if you think you are having a problem with your iron pills. Even after you start to feel better, it will take several months for your body to build up its supply of iron. ¨ If you miss a pill, don't take a double dose. ¨ Keep iron pills out of the reach of small children. Too much iron can be very dangerous. · Eat foods with a lot of iron. These include red meat, shellfish, poultry, and eggs. They also include beans, raisins, whole-grain bread, and leafy green vegetables. · Steam your vegetables. This is the best way to prepare them if you want to get as much iron as possible. · Be safe with medicines. Do not take nonsteroidal anti-inflammatory pain relievers unless your doctor tells you to. These include aspirin, naproxen (Aleve), and ibuprofen (Advil, Motrin). · Liquid iron can stain your teeth. But you can mix it with water or juice and drink it with a straw. Then it won't get on your teeth. When should you call for help? Call 911 anytime you think you may need emergency care. For example, call if: 
  · You passed out (lost consciousness).  
 Call your doctor now or seek immediate medical care if: 
  · You are short of breath.  
  · You are dizzy or light-headed, or you feel like you may faint.  
  · You have new or worse bleeding.  
 Watch closely for changes in your health, and be sure to contact your doctor if: 
  · You feel weaker or more tired than usual.  
  · You do not get better as expected. Where can you learn more? Go to http://crista-guido.info/. Enter V416 in the search box to learn more about \"Iron Deficiency Anemia: Care Instructions. \" Current as of: October 9, 2017 Content Version: 11.7 © 5398-1107 [employer]. Care instructions adapted under license by [employer] (which disclaims liability or warranty for this information). If you have questions about a medical condition or this instruction, always ask your healthcare professional. [address] any warranty or liability for your use of this information. Introducing [facility]! [employer] introduces [employer] patient portal. Now you can access parts of your medical record, email your doctor's office, and request medication refills online. 1. In your internet browser, go to [email or web address] 2. Click on the First Time User? Click Here link in the Sign In box. You will see the New Member Sign Up page. 3. Enter your [employer] Access Code exactly as it appears below. You will not need to use this code after youve completed the sign-up process. If you do not sign up before the expiration date, you must request a new code. · [employer] Access Code: [email or web address] Expires: 11/16/2018  9:51 AM 
 
4. Enter the last four digits of your Social Security Number (xxxx) and Date of Birth (mm/dd/yyyy) as indicated and click Submit. You will be taken to the next sign-up page. 5. Create a [employer] ID. This will be your [employer] login ID and cannot be changed, so think of one that is secure and easy to remember. 6. Create a [employer] password. You can change your password at any time. 7. Enter your Password Reset Question and Answer. This can be used at a later time if you forget your password. 8. Enter your e-mail address. You will receive e-mail notification when new information is available in [address] 9. Click Sign Up. You can now view and download portions of your medical record. 10. Click the Download Summary menu link to download a portable copy of your medical information. If you have questions, please visit the Frequently Asked Questions section of the [employer] website. Remember, [employer] is NOT to be used for urgent needs. For medical emergencies, dial 911. Now available from your iPhone and Android! Please provide this summary of care documentation to your next provider. Your primary care clinician is listed as Martell Kwan. If you have any questions after today's visit, please call [phone].

## 2018-08-18 NOTE — PATIENT INSTRUCTIONS
Iron Deficiency Anemia: Care Instructions  Your Care Instructions    Anemia means that you do not have enough red blood cells. Red blood cells carry oxygen around your body. When you have anemia, it can make you pale, weak, and tired. Many things can cause anemia. The most common cause is loss of blood. This can happen if you have heavy menstrual periods. It can also happen if you have bleeding in your stomach or bowel. You can also get anemia if you don't have enough iron in your diet or if it's hard for your body to absorb iron. In some cases, pregnancy causes anemia. That's because a pregnant woman needs more iron. Your doctor may do more tests to find the cause of your anemia. If a disease or other health problem is causing it, your doctor will treat that problem. It's important to follow up with your doctor to make sure that your iron level returns to normal.  Follow-up care is a key part of your treatment and safety. Be sure to make and go to all appointments, and call your doctor if you are having problems. It's also a good idea to know your test results and keep a list of the medicines you take. How can you care for yourself at home? · If your doctor recommended iron pills, take them as directed. ¨ Try to take the pills on an empty stomach. You can do this about 1 hour before or 2 hours after meals. But you may need to take iron with food to avoid an upset stomach. ¨ Do not take antacids or drink milk or anything with caffeine within 2 hours of when you take your iron. They can keep your body from absorbing the iron well. ¨ Vitamin C helps your body absorb iron. You may want to take iron pills with a glass of orange juice or some other food high in vitamin C.  ¨ Iron pills may cause stomach problems. These include heartburn, nausea, diarrhea, constipation, and cramps. It can help to drink plenty of fluids and include fruits, vegetables, and fiber in your diet.   ¨ It's normal for iron pills to make your stool a greenish or grayish black. But internal bleeding can also cause dark stool. So it's important to tell your doctor about any color changes. ¨ Call your doctor if you think you are having a problem with your iron pills. Even after you start to feel better, it will take several months for your body to build up its supply of iron. ¨ If you miss a pill, don't take a double dose. ¨ Keep iron pills out of the reach of small children. Too much iron can be very dangerous. · Eat foods with a lot of iron. These include red meat, shellfish, poultry, and eggs. They also include beans, raisins, whole-grain bread, and leafy green vegetables. · Steam your vegetables. This is the best way to prepare them if you want to get as much iron as possible. · Be safe with medicines. Do not take nonsteroidal anti-inflammatory pain relievers unless your doctor tells you to. These include aspirin, naproxen (Aleve), and ibuprofen (Advil, Motrin). · Liquid iron can stain your teeth. But you can mix it with water or juice and drink it with a straw. Then it won't get on your teeth. When should you call for help? Call 911 anytime you think you may need emergency care. For example, call if:    · You passed out (lost consciousness).    Call your doctor now or seek immediate medical care if:    · You are short of breath.     · You are dizzy or light-headed, or you feel like you may faint.     · You have new or worse bleeding.    Watch closely for changes in your health, and be sure to contact your doctor if:    · You feel weaker or more tired than usual.     · You do not get better as expected. Where can you learn more? Go to http://crista-guido.info/. Enter V487 in the search box to learn more about \"Iron Deficiency Anemia: Care Instructions. \"  Current as of: October 9, 2017  Content Version: 11.7  © 2442-7804 [employer].  Care instructions adapted under license by [employer] (which disclaims liability or warranty for this information). If you have questions about a medical condition or this instruction, always ask your healthcare professional. [address] any warranty or liability for your use of this information.

## 2018-08-18 NOTE — PROGRESS NOTES
Man Ramirez is a 38 y.o. male presents in office for recheck on hgb. Health Maintenance Due   Topic Date Due    Pneumococcal 19-64 Medium Risk (1 of 1 - PPSV23) 09/13/1992    DTaP/Tdap/Td series (1 - Tdap) 09/13/1994    Influenza Age 9 to Adult  08/01/2018       1. Have you been to the ER, urgent care clinic since your last visit? Hospitalized since your last visit? No    2. Have you seen or consulted any other health care providers outside of the [facility] since your last visit? Include any pap smears or colon screening.  No

## 2018-08-18 NOTE — PROGRESS NOTES
Severo Angeles is a 38 y.o.  male and presents with F/U for GI bleed causing anemia. Patient recently treated for H.  Pylori apparently. Chief Complaint   Patient presents with    GI Problem     f/u gi bleed    Labs     recheck hemeglobin     Subjective: Additional Concerns: none    Patient Active Problem List    Diagnosis Date Noted    History of migraine 01/09/2015    History of vitamin D deficiency 01/09/2015     Current Outpatient Prescriptions   Medication Sig Dispense Refill    pantoprazole (PROTONIX) 40 mg tablet Take 1 Tab by mouth Daily (before breakfast). 3    multivitamin (ONE A DAY) tablet Take 1 Tab by mouth daily.  butalbital-acetaminophen-caffeine (FIORICET, ESGIC) -40 mg per tablet Take 1 Tab by mouth every six (6) hours as needed for Pain. Indications: Migraine 45 Tab 2     No Known Allergies  Past Medical History:   Diagnosis Date    Migraine      No past surgical history on file.   Family History   Problem Relation Age of Onset    Hypertension Mother     Elevated Lipids Mother     Diabetes Father     Hypertension Father    [facility] Elevated Lipids Father     Heart Disease Father      Social History   Substance Use Topics    Smoking status: Current Every Day Smoker     Packs/day: 0.50    Smokeless tobacco: Never Used    Alcohol use No     ROS     General: negative for - chills, fatigue, fever, weight change  Psych: negative for - anxiety, depression, irritability or mood swings  ENT: negative for - headaches, hearing change, nasal congestion, oral lesions, sneezing or sore throat  Heme/ Lymph: negative for - bleeding problems, bruising, pallor or swollen lymph nodes  Endo: negative for - hot flashes, polydipsia/polyuria or temperature intolerance  Resp: negative for - cough, shortness of breath or wheezing  CV: negative for - chest pain, edema or palpitations  GI: negative for - abdominal pain, change in bowel habits, constipation, diarrhea or nausea/vomiting  : negative for - dysuria, hematuria, incontinence, pelvic pain or vulvar/vaginal symptoms  MSK: negative for - joint pain, joint swelling or muscle pain  Neuro: negative for - confusion, headaches, seizures or weakness  Derm: negative for - dry skin, hair changes, rash or skin lesion changes    Objective:  Vitals:    08/18/18 0913   BP: 100/68   Pulse: (!) 101   Resp: 17   Temp: 97.8 °F (36.6 °C)   TempSrc: Oral   SpO2: 100%   Weight: 178 lb 6.4 oz (80.9 kg)   Height: 5' 6\" (1.676 m)   PainSc:   0 - No pain     PE    Alert, well appearing, and in no distress, oriented to person, place, and time and overweight  General appearance - alert, well appearing, and in no distress, oriented to person, place, and time and overweight  Mental status - alert, oriented to person, place, and time, normal mood, behavior, speech, dress, motor activity, and thought processes  Chest - clear to auscultation, no wheezes, rales or rhonchi, symmetric air entry  Heart - normal rate, regular rhythm, normal S1, S2, no murmurs, rubs, clicks or gallops  Extremities - peripheral pulses normal, no pedal edema, no clubbing or cyanosis    [facility] Outpatient Visit on 07/18/2018   Component Date Value Ref Range Status    WBC 07/18/2018 8.6  4.6 - 13.2 K/uL Final    RBC 07/18/2018 2.47* 4.70 - 5.50 M/uL Final    HGB 07/18/2018 8.2* 13.0 - 16.0 g/dL Final    HCT 07/18/2018 26.3* 36.0 - 48.0 % Final    MCV 07/18/2018 106.5* 74.0 - 97.0 FL Final    MCH 07/18/2018 33.2  24.0 - 34.0 PG Final    MCHC 07/18/2018 31.2  31.0 - 37.0 g/dL Final    RDW 07/18/2018 15.4* 11.6 - 14.5 % Final    PLATELET 64/07/6114 874  135 - 420 K/uL Final    MPV 07/18/2018 9.8  9.2 - 11.8 FL Final    NEUTROPHILS 07/18/2018 68  40 - 73 % Final    LYMPHOCYTES 07/18/2018 25  21 - 52 % Final    MONOCYTES 07/18/2018 6  3 - 10 % Final    EOSINOPHILS 07/18/2018 1  0 - 5 % Final    BASOPHILS 07/18/2018 0  0 - 2 % Final    ABS.  NEUTROPHILS 07/18/2018 5.8  1.8 - 8.0 K/UL Final    ABS. LYMPHOCYTES 07/18/2018 2.1  0.9 - 3.6 K/UL Final    ABS. MONOCYTES 07/18/2018 0.5  0.05 - 1.2 K/UL Final    ABS. EOSINOPHILS 07/18/2018 0.1  0.0 - 0.4 K/UL Final    ABS. BASOPHILS 07/18/2018 0.0  0.0 - 0.1 K/UL Final    DF 07/18/2018 AUTOMATED    Final    LIPID PROFILE 07/18/2018        Final    Cholesterol, total 07/18/2018 216* <200 MG/DL Final    Triglyceride 07/18/2018 198* <150 MG/DL Final    Comment: The drugs N-acetylcysteine (NAC) and  Metamiszole have been found to cause falsely  low results in this chemical assay. Please  be sure to submit blood samples obtained  BEFORE administration of either of these  drugs to assure correct results.  HDL Cholesterol 07/18/2018 56  40 - 60 MG/DL Final    LDL, calculated 07/18/2018 120.4* 0 - 100 MG/DL Final    VLDL, calculated 07/18/2018 39.6  MG/DL Final    CHOL/HDL Ratio 07/18/2018 3.9  0 - 5.0   Final   Hospital Outpatient Visit on 07/11/2018   Component Date Value Ref Range Status    WBC 07/11/2018 11.6  4.6 - 13.2 K/uL Final    RBC 07/11/2018 2.52* 4.70 - 5.50 M/uL Final    HGB 07/11/2018 8.4* 13.0 - 16.0 g/dL Final    HCT 07/11/2018 25.2* 36.0 - 48.0 % Final    MCV 07/11/2018 100.0* 74.0 - 97.0 FL Final    MCH 07/11/2018 33.3  24.0 - 34.0 PG Final    MCHC 07/11/2018 33.3  31.0 - 37.0 g/dL Final    RDW 07/11/2018 13.6  11.6 - 14.5 % Final    PLATELET 22/91/7080 910  135 - 420 K/uL Final    MPV 07/11/2018 11.4  9.2 - 11.8 FL Final    NEUTROPHILS 07/11/2018 69  40 - 73 % Final    LYMPHOCYTES 07/11/2018 24  21 - 52 % Final    MONOCYTES 07/11/2018 6  3 - 10 % Final    EOSINOPHILS 07/11/2018 1  0 - 5 % Final    BASOPHILS 07/11/2018 0  0 - 2 % Final    ABS. NEUTROPHILS 07/11/2018 8.0  1.8 - 8.0 K/UL Final    ABS. LYMPHOCYTES 07/11/2018 2.8  0.9 - 3.6 K/UL Final    ABS. MONOCYTES 07/11/2018 0.7  0.05 - 1.2 K/UL Final    ABS. EOSINOPHILS 07/11/2018 0.1  0.0 - 0.4 K/UL Final    ABS.  BASOPHILS 07/11/2018 0.0  0.0 - 0.1 K/UL Final    PLATELET COMMENTS 98/24/0637 ADEQUATE PLATELETS    Final    RBC COMMENTS 07/11/2018     Final                    Value:MACROCYTOSIS  1+      DF 07/11/2018 MANUAL    Final    Sodium 07/11/2018 140  136 - 145 mmol/L Final    Potassium 07/11/2018 4.2  3.5 - 5.5 mmol/L Final    Chloride 07/11/2018 108  100 - 108 mmol/L Final    CO2 07/11/2018 25  21 - 32 mmol/L Final    Anion gap 07/11/2018 7  3.0 - 18 mmol/L Final    Glucose 07/11/2018 115* 74 - 99 mg/dL Final    BUN 07/11/2018 17  7.0 - 18 MG/DL Final    Creatinine 07/11/2018 0.87  0.6 - 1.3 MG/DL Final    BUN/Creatinine ratio 07/11/2018 20  12 - 20   Final    GFR est AA 07/11/2018 >60  >60 ml/min/1.73m2 Final    GFR est non-AA 07/11/2018 >60  >60 ml/min/1.73m2 Final    Comment: (NOTE)  Estimated GFR is calculated using the Modification of Diet in Renal   Disease (MDRD) Study equation, reported for both  Americans   (GFRAA) and non- Americans (GFRNA), and normalized to 1.73m2   body surface area. The physician must decide which value applies to   the patient. The MDRD study equation should only be used in   individuals age 25 or older. It has not been validated for the   following: pregnant women, patients with serious comorbid conditions,   or on certain medications, or persons with extremes of body size,   muscle mass, or nutritional status.  Calcium 07/11/2018 8.6  8.5 - 10.1 MG/DL Final    Bilirubin, total 07/11/2018 0.4  0.2 - 1.0 MG/DL Final    ALT (SGPT) 07/11/2018 32  16 - 61 U/L Final    AST (SGOT) 07/11/2018 24  15 - 37 U/L Final    Alk.  phosphatase 07/11/2018 62  45 - 117 U/L Final    Protein, total 07/11/2018 6.7  6.4 - 8.2 g/dL Final    Albumin 07/11/2018 3.9  3.4 - 5.0 g/dL Final    Globulin 07/11/2018 2.8  2.0 - 4.0 g/dL Final    A-G Ratio 07/11/2018 1.4  0.8 - 1.7   Final    TSH 07/11/2018 1.20  0.36 - 3.74 uIU/mL Final    Hemoglobin A1c 07/11/2018 5.3  4.2 - 5.6 % Final Comment: (NOTE)  HbA1C Interpretive Ranges  <5.7              Normal  5.7 - 6.4         Consider Prediabetes  >6.5              Consider Diabetes      Est. average glucose 07/11/2018 105  mg/dL Final    Comment: (NOTE)  The eAG should be interpreted with patient characteristics in mind   since ethnicity, interindividual differences, red cell lifespan,   variation in rates of glycation, etc. may affect the validity of the   calculation.  Calcitriol (Vit D 1, 25 di-OH) 07/11/2018 79.0  19.9 - 79.3 pg/mL Final    Comment: (NOTE)  Performed At: [address] [id number]  Cornelio Mcbride MD [id number]         TESTS  Results for orders placed or performed during the hospital encounter of 07/18/18   CBC WITH AUTOMATED DIFF   Result Value Ref Range    WBC 8.6 4.6 - 13.2 K/uL    RBC 2.47 (L) 4.70 - 5.50 M/uL    HGB 8.2 (L) 13.0 - 16.0 g/dL    HCT 26.3 (L) 36.0 - 48.0 %    .5 (H) 74.0 - 97.0 FL    MCH 33.2 24.0 - 34.0 PG    MCHC 31.2 31.0 - 37.0 g/dL    RDW 15.4 (H) 11.6 - 14.5 %    PLATELET 360 324 - 281 K/uL    MPV 9.8 9.2 - 11.8 FL    NEUTROPHILS 68 40 - 73 %    LYMPHOCYTES 25 21 - 52 %    MONOCYTES 6 3 - 10 %    EOSINOPHILS 1 0 - 5 %    BASOPHILS 0 0 - 2 %    ABS. NEUTROPHILS 5.8 1.8 - 8.0 K/UL    ABS. LYMPHOCYTES 2.1 0.9 - 3.6 K/UL    ABS. MONOCYTES 0.5 0.05 - 1.2 K/UL    ABS. EOSINOPHILS 0.1 0.0 - 0.4 K/UL    ABS. BASOPHILS 0.0 0.0 - 0.1 K/UL    DF AUTOMATED     LIPID PANEL   Result Value Ref Range    LIPID PROFILE          Cholesterol, total 216 (H) <200 MG/DL    Triglyceride 198 (H) <150 MG/DL    HDL Cholesterol 56 40 - 60 MG/DL    LDL, calculated 120.4 (H) 0 - 100 MG/DL    VLDL, calculated 39.6 MG/DL    CHOL/HDL Ratio 3.9 0 - 5.0       Assessment/Plan:      Blood loss anemia with high MCV  - pantoprazole (PROTONIX) 40 mg tablet; Take 1 Tab by mouth Daily (before breakfast). ; Refill: 3  - CBC WITH AUTOMATED DIFF; Future  - VITAMIN B12 & FOLATE;  Future    Lab review: orders written for new lab studies as appropriate; see orders. We will call for results and further plan. I have discussed the diagnosis with the patient and the intended plan as seen in the above orders. The patient has received an after-visit summary and questions were answered concerning future plans. I have discussed medication side effects and warnings with the patient as well. I have reviewed the plan of care with the patient, accepted their input and they are in agreement with the treatment goals. F/U as needed. Routine 3 months.      Melvin Rodriguez MD

## 2018-08-19 NOTE — PROGRESS NOTES
Pls report to patient his hgb increased from 8.2 to now 9.5 over one month. Pls send a copy to GI as well. Recheck in 3 months or when GI decides to recheck it also.

## 2018-08-21 ENCOUNTER — TELEPHONE (OUTPATIENT)
Dept: FAMILY MEDICINE CLINIC | Age: 43
End: 2018-08-21

## 2018-08-21 RX ORDER — ERGOCALCIFEROL (VITAMIN D2) 10 MCG
2 TABLET ORAL DAILY
COMMUNITY

## 2018-08-21 NOTE — TELEPHONE ENCOUNTER
----- Message from Favio Hernandez MD sent at 8/19/2018 12:24 PM EDT -----  Pls report to patient his hgb increased from 8.2 to now 9.5 over one month. Pls send a copy to GI as well. Recheck in 3 months or when GI decides to recheck it also.

## 2018-08-21 NOTE — TELEPHONE ENCOUNTER
Called and spoke to patient's wife regarding the lab results. She verbalized understanding. She will call GI to see if they still need the upcoming appt with their office.

## 2022-04-13 ENCOUNTER — OFFICE VISIT (OUTPATIENT)
Dept: RURAL CLINIC 8 | Facility: CLINIC | Age: 47
End: 2022-04-13
Payer: MEDICARE

## 2022-04-13 ENCOUNTER — WEB ENCOUNTER (OUTPATIENT)
Dept: RURAL CLINIC 2 | Facility: CLINIC | Age: 47
End: 2022-04-13

## 2022-04-13 ENCOUNTER — DASHBOARD ENCOUNTERS (OUTPATIENT)
Age: 47
End: 2022-04-13

## 2022-04-13 DIAGNOSIS — I50.22 CHRONIC SYSTOLIC CONGESTIVE HEART FAILURE: ICD-10-CM

## 2022-04-13 DIAGNOSIS — Z79.01 ANTICOAGULANT LONG-TERM USE: ICD-10-CM

## 2022-04-13 DIAGNOSIS — Z87.19 HISTORY OF LIVER DISEASE: ICD-10-CM

## 2022-04-13 DIAGNOSIS — Z95.0 PACEMAKER: ICD-10-CM

## 2022-04-13 PROBLEM — 441509002: Status: ACTIVE | Noted: 2022-04-13

## 2022-04-13 PROBLEM — 711150003: Status: ACTIVE | Noted: 2022-04-13

## 2022-04-13 PROBLEM — 441481004: Status: ACTIVE | Noted: 2022-04-13

## 2022-04-13 PROCEDURE — 99205 OFFICE O/P NEW HI 60 MIN: CPT | Performed by: INTERNAL MEDICINE

## 2022-04-13 RX ORDER — SACUBITRIL AND VALSARTAN 49; 51 MG/1; MG/1
1 TABLET TABLET, FILM COATED ORAL TWICE A DAY
Status: ACTIVE | COMMUNITY

## 2022-04-13 RX ORDER — ATORVASTATIN CALCIUM 40 MG/1
1 TABLET TABLET, FILM COATED ORAL ONCE A DAY
Status: ACTIVE | COMMUNITY

## 2022-04-13 RX ORDER — APIXABAN 5 MG/1
AS DIRECTED TABLET, FILM COATED ORAL
Status: ACTIVE | COMMUNITY

## 2022-04-13 RX ORDER — MULTIVITAMIN/IRON/FOLIC ACID 18MG-0.4MG
AS DIRECTED TABLET ORAL
Status: DISCONTINUED | COMMUNITY

## 2022-04-13 RX ORDER — COLCHICINE 0.6 MG/1
1 TABLET TABLET, FILM COATED ORAL
Status: ACTIVE | COMMUNITY

## 2022-04-13 RX ORDER — SPIRONOLACTONE 25 MG/1
1 TABLET TABLET, FILM COATED ORAL
Status: ACTIVE | COMMUNITY

## 2022-04-13 RX ORDER — CARVEDILOL 25 MG/1
1 TABLET WITH FOOD TABLET, FILM COATED ORAL TWICE A DAY
Status: ACTIVE | COMMUNITY

## 2022-04-13 RX ORDER — DOCUSATE SODIUM 100 MG/1
1 CAPSULE AS NEEDED CAPSULE, LIQUID FILLED ORAL ONCE A DAY
Status: ACTIVE | COMMUNITY

## 2022-04-13 RX ORDER — TORSEMIDE 20 MG/1
AS DIRECTED TABLET ORAL
Status: ACTIVE | COMMUNITY

## 2022-04-13 RX ORDER — ASPIRIN 81 MG/1
1 TABLET TABLET, COATED ORAL ONCE A DAY
Status: ACTIVE | COMMUNITY

## 2022-04-13 RX ORDER — MULTIVITAMIN
1 TABLET TABLET ORAL ONCE A DAY
Status: ACTIVE | COMMUNITY

## 2022-04-13 RX ORDER — TRAZODONE HYDROCHLORIDE 100 MG/1
1 TABLET AT BEDTIME TABLET, FILM COATED ORAL ONCE A DAY
Status: ACTIVE | COMMUNITY

## 2022-04-13 NOTE — HPI-TODAY'S VISIT:
this patient comes on referral from Dr. Karlie Javier.  A copy of the consultation will be sent to the referring physician.  He comes for history of "liver failure ".  I was able to review laboratory from March of 2022 that shows a normal bilirubin a normal AST and ALT.  Alkaline phosphatase is only minimally elevated at 134.  His platelets are normal and also recent abdominal ultrasound showed no acute process in the abdomen  With a normal liver and echogenicity of the liver. -  he also carries a history of "chronic systolic heart failure".  I was not provided a echocardiogram for evaluation of his left or right heart function.  He is also on Eliquis chronically and has a pacemaker.  No details regarding the reason for this are available at the time of his visit. -  he does tell me that he was evaluated by a liver doctor in 2018 when he was admitted to the hospital for "3 heart attacks and a pacemaker put in"the family member who accompanies the patient today tells me that his liver dysfunction at that time was due to his heart dysfunction.  He has not had any evaluation for any liver problems in the last 4 years has not seen a liver doctor and endorses no family history of liver problems